# Patient Record
Sex: FEMALE | Race: BLACK OR AFRICAN AMERICAN | Employment: FULL TIME | URBAN - METROPOLITAN AREA
[De-identification: names, ages, dates, MRNs, and addresses within clinical notes are randomized per-mention and may not be internally consistent; named-entity substitution may affect disease eponyms.]

---

## 2017-06-02 ENCOUNTER — HOSPITAL ENCOUNTER (OUTPATIENT)
Dept: CT IMAGING | Age: 67
Discharge: HOME | End: 2017-06-02
Payer: MEDICARE

## 2017-06-02 DIAGNOSIS — R31.9 HEMATURIA: ICD-10-CM

## 2017-06-02 DIAGNOSIS — N13.30 HYDRONEPHROSIS: ICD-10-CM

## 2017-06-02 PROCEDURE — 74176 CT ABD & PELVIS W/O CONTRAST: CPT

## 2017-12-23 ENCOUNTER — ANESTHESIA (OUTPATIENT)
Dept: SURGERY | Age: 67
End: 2017-12-23
Payer: MEDICARE

## 2017-12-23 ENCOUNTER — APPOINTMENT (OUTPATIENT)
Dept: CT IMAGING | Age: 67
End: 2017-12-23
Attending: EMERGENCY MEDICINE
Payer: MEDICARE

## 2017-12-23 ENCOUNTER — ANESTHESIA EVENT (OUTPATIENT)
Dept: SURGERY | Age: 67
End: 2017-12-23
Payer: MEDICARE

## 2017-12-23 ENCOUNTER — HOSPITAL ENCOUNTER (EMERGENCY)
Age: 67
Discharge: HOME | End: 2017-12-23
Attending: EMERGENCY MEDICINE | Admitting: UROLOGY
Payer: MEDICARE

## 2017-12-23 VITALS
OXYGEN SATURATION: 98 % | SYSTOLIC BLOOD PRESSURE: 149 MMHG | DIASTOLIC BLOOD PRESSURE: 67 MMHG | WEIGHT: 140 LBS | BODY MASS INDEX: 26.43 KG/M2 | TEMPERATURE: 97.2 F | HEART RATE: 53 BPM | RESPIRATION RATE: 16 BRPM | HEIGHT: 61 IN

## 2017-12-23 DIAGNOSIS — N20.1 LEFT URETERAL STONE: Primary | ICD-10-CM

## 2017-12-23 DIAGNOSIS — N20.1 RIGHT URETERAL STONE: ICD-10-CM

## 2017-12-23 LAB
ALBUMIN SERPL-MCNC: 3.6 G/DL (ref 3.2–4.6)
ALBUMIN/GLOB SERPL: 1 {RATIO} (ref 1.2–3.5)
ALP SERPL-CCNC: 88 U/L (ref 50–136)
ALT SERPL-CCNC: 32 U/L (ref 12–65)
ANION GAP SERPL CALC-SCNC: 7 MMOL/L (ref 7–16)
AST SERPL-CCNC: 24 U/L (ref 15–37)
BASOPHILS # BLD: 0 K/UL (ref 0–0.2)
BASOPHILS NFR BLD: 0 % (ref 0–2)
BILIRUB SERPL-MCNC: 0.4 MG/DL (ref 0.2–1.1)
BUN SERPL-MCNC: 27 MG/DL (ref 8–23)
CALCIUM SERPL-MCNC: 10.1 MG/DL (ref 8.3–10.4)
CHLORIDE SERPL-SCNC: 107 MMOL/L (ref 98–107)
CO2 SERPL-SCNC: 28 MMOL/L (ref 21–32)
CREAT SERPL-MCNC: 1.12 MG/DL (ref 0.6–1)
DIFFERENTIAL METHOD BLD: ABNORMAL
EOSINOPHIL # BLD: 0 K/UL (ref 0–0.8)
EOSINOPHIL NFR BLD: 0 % (ref 0.5–7.8)
ERYTHROCYTE [DISTWIDTH] IN BLOOD BY AUTOMATED COUNT: 13.9 % (ref 11.9–14.6)
GLOBULIN SER CALC-MCNC: 3.6 G/DL (ref 2.3–3.5)
GLUCOSE SERPL-MCNC: 124 MG/DL (ref 65–100)
HCT VFR BLD AUTO: 39.6 % (ref 35.8–46.3)
HGB BLD-MCNC: 13.4 G/DL (ref 11.7–15.4)
IMM GRANULOCYTES # BLD: 0 K/UL (ref 0–0.5)
IMM GRANULOCYTES NFR BLD AUTO: 0 % (ref 0–5)
LYMPHOCYTES # BLD: 0.9 K/UL (ref 0.5–4.6)
LYMPHOCYTES NFR BLD: 10 % (ref 13–44)
MCH RBC QN AUTO: 31.4 PG (ref 26.1–32.9)
MCHC RBC AUTO-ENTMCNC: 33.8 G/DL (ref 31.4–35)
MCV RBC AUTO: 92.7 FL (ref 79.6–97.8)
MONOCYTES # BLD: 0.7 K/UL (ref 0.1–1.3)
MONOCYTES NFR BLD: 8 % (ref 4–12)
NEUTS SEG # BLD: 7.9 K/UL (ref 1.7–8.2)
NEUTS SEG NFR BLD: 82 % (ref 43–78)
PLATELET # BLD AUTO: 262 K/UL (ref 150–450)
PMV BLD AUTO: 11.2 FL (ref 10.8–14.1)
POTASSIUM SERPL-SCNC: 3.8 MMOL/L (ref 3.5–5.1)
PROT SERPL-MCNC: 7.2 G/DL (ref 6.3–8.2)
RBC # BLD AUTO: 4.27 M/UL (ref 4.05–5.25)
SODIUM SERPL-SCNC: 142 MMOL/L (ref 136–145)
WBC # BLD AUTO: 9.6 K/UL (ref 4.3–11.1)

## 2017-12-23 PROCEDURE — 76060000034 HC ANESTHESIA 1.5 TO 2 HR: Performed by: UROLOGY

## 2017-12-23 PROCEDURE — 74011250636 HC RX REV CODE- 250/636: Performed by: ANESTHESIOLOGY

## 2017-12-23 PROCEDURE — 77030018832 HC SOL IRR H20 ICUM -A: Performed by: UROLOGY

## 2017-12-23 PROCEDURE — 80053 COMPREHEN METABOLIC PANEL: CPT | Performed by: EMERGENCY MEDICINE

## 2017-12-23 PROCEDURE — 74011250636 HC RX REV CODE- 250/636: Performed by: UROLOGY

## 2017-12-23 PROCEDURE — 85025 COMPLETE CBC W/AUTO DIFF WBC: CPT | Performed by: EMERGENCY MEDICINE

## 2017-12-23 PROCEDURE — 77030008703 HC TU ET UNCUF COVD -A: Performed by: ANESTHESIOLOGY

## 2017-12-23 PROCEDURE — C1769 GUIDE WIRE: HCPCS | Performed by: UROLOGY

## 2017-12-23 PROCEDURE — 74011250636 HC RX REV CODE- 250/636

## 2017-12-23 PROCEDURE — 74176 CT ABD & PELVIS W/O CONTRAST: CPT

## 2017-12-23 PROCEDURE — 77030032490 HC SLV COMPR SCD KNE COVD -B: Performed by: UROLOGY

## 2017-12-23 PROCEDURE — 77030019927 HC TBNG IRR CYSTO BAXT -A: Performed by: UROLOGY

## 2017-12-23 PROCEDURE — 74011250636 HC RX REV CODE- 250/636: Performed by: EMERGENCY MEDICINE

## 2017-12-23 PROCEDURE — 99284 EMERGENCY DEPT VISIT MOD MDM: CPT | Performed by: EMERGENCY MEDICINE

## 2017-12-23 PROCEDURE — 76010000162 HC OR TIME 1.5 TO 2 HR INTENSV-TIER 1: Performed by: UROLOGY

## 2017-12-23 PROCEDURE — C2617 STENT, NON-COR, TEM W/O DEL: HCPCS | Performed by: UROLOGY

## 2017-12-23 PROCEDURE — 76210000020 HC REC RM PH II FIRST 0.5 HR: Performed by: UROLOGY

## 2017-12-23 PROCEDURE — C1758 CATHETER, URETERAL: HCPCS | Performed by: UROLOGY

## 2017-12-23 PROCEDURE — 74011000250 HC RX REV CODE- 250

## 2017-12-23 PROCEDURE — 96375 TX/PRO/DX INJ NEW DRUG ADDON: CPT | Performed by: EMERGENCY MEDICINE

## 2017-12-23 PROCEDURE — 96374 THER/PROPH/DIAG INJ IV PUSH: CPT | Performed by: EMERGENCY MEDICINE

## 2017-12-23 PROCEDURE — 76210000006 HC OR PH I REC 0.5 TO 1 HR: Performed by: UROLOGY

## 2017-12-23 PROCEDURE — 81003 URINALYSIS AUTO W/O SCOPE: CPT | Performed by: EMERGENCY MEDICINE

## 2017-12-23 DEVICE — URETERAL STENT
Type: IMPLANTABLE DEVICE | Site: URETER | Status: FUNCTIONAL
Brand: PERCUFLEX™ PLUS

## 2017-12-23 RX ORDER — ESMOLOL HYDROCHLORIDE 10 MG/ML
INJECTION INTRAVENOUS AS NEEDED
Status: DISCONTINUED | OUTPATIENT
Start: 2017-12-23 | End: 2017-12-23 | Stop reason: HOSPADM

## 2017-12-23 RX ORDER — HYDROMORPHONE HYDROCHLORIDE 2 MG/ML
0.5 INJECTION, SOLUTION INTRAMUSCULAR; INTRAVENOUS; SUBCUTANEOUS
Status: DISCONTINUED | OUTPATIENT
Start: 2017-12-23 | End: 2017-12-23 | Stop reason: HOSPADM

## 2017-12-23 RX ORDER — ONDANSETRON 2 MG/ML
INJECTION INTRAMUSCULAR; INTRAVENOUS AS NEEDED
Status: DISCONTINUED | OUTPATIENT
Start: 2017-12-23 | End: 2017-12-23 | Stop reason: HOSPADM

## 2017-12-23 RX ORDER — NEOSTIGMINE METHYLSULFATE 1 MG/ML
INJECTION INTRAVENOUS AS NEEDED
Status: DISCONTINUED | OUTPATIENT
Start: 2017-12-23 | End: 2017-12-23 | Stop reason: HOSPADM

## 2017-12-23 RX ORDER — LIDOCAINE HYDROCHLORIDE 20 MG/ML
INJECTION, SOLUTION EPIDURAL; INFILTRATION; INTRACAUDAL; PERINEURAL AS NEEDED
Status: DISCONTINUED | OUTPATIENT
Start: 2017-12-23 | End: 2017-12-23 | Stop reason: HOSPADM

## 2017-12-23 RX ORDER — OXYCODONE AND ACETAMINOPHEN 7.5; 325 MG/1; MG/1
1 TABLET ORAL
Qty: 30 TAB | Refills: 0 | Status: SHIPPED | OUTPATIENT
Start: 2017-12-23

## 2017-12-23 RX ORDER — CEFAZOLIN SODIUM/WATER 2 G/20 ML
2 SYRINGE (ML) INTRAVENOUS
Status: COMPLETED | OUTPATIENT
Start: 2017-12-23 | End: 2017-12-23

## 2017-12-23 RX ORDER — CIPROFLOXACIN 500 MG/1
500 TABLET ORAL 2 TIMES DAILY
Qty: 10 TAB | Refills: 0 | Status: SHIPPED | OUTPATIENT
Start: 2017-12-23 | End: 2017-12-28

## 2017-12-23 RX ORDER — OXYCODONE HYDROCHLORIDE 5 MG/1
5 TABLET ORAL
Status: DISCONTINUED | OUTPATIENT
Start: 2017-12-23 | End: 2017-12-23 | Stop reason: HOSPADM

## 2017-12-23 RX ORDER — ONDANSETRON 4 MG/1
4 TABLET, ORALLY DISINTEGRATING ORAL
Qty: 8 TAB | Refills: 2 | Status: SHIPPED | OUTPATIENT
Start: 2017-12-23 | End: 2018-06-27 | Stop reason: ALTCHOICE

## 2017-12-23 RX ORDER — ROCURONIUM BROMIDE 10 MG/ML
INJECTION, SOLUTION INTRAVENOUS AS NEEDED
Status: DISCONTINUED | OUTPATIENT
Start: 2017-12-23 | End: 2017-12-23 | Stop reason: HOSPADM

## 2017-12-23 RX ORDER — PHENAZOPYRIDINE HYDROCHLORIDE 200 MG/1
200 TABLET, FILM COATED ORAL
Qty: 12 TAB | Refills: 0 | Status: SHIPPED | OUTPATIENT
Start: 2017-12-23 | End: 2017-12-27

## 2017-12-23 RX ORDER — MIDAZOLAM HYDROCHLORIDE 1 MG/ML
2 INJECTION, SOLUTION INTRAMUSCULAR; INTRAVENOUS
Status: DISCONTINUED | OUTPATIENT
Start: 2017-12-23 | End: 2017-12-23 | Stop reason: HOSPADM

## 2017-12-23 RX ORDER — SODIUM CHLORIDE, SODIUM LACTATE, POTASSIUM CHLORIDE, CALCIUM CHLORIDE 600; 310; 30; 20 MG/100ML; MG/100ML; MG/100ML; MG/100ML
75 INJECTION, SOLUTION INTRAVENOUS CONTINUOUS
Status: DISCONTINUED | OUTPATIENT
Start: 2017-12-23 | End: 2017-12-23 | Stop reason: HOSPADM

## 2017-12-23 RX ORDER — FENTANYL CITRATE 50 UG/ML
INJECTION, SOLUTION INTRAMUSCULAR; INTRAVENOUS AS NEEDED
Status: DISCONTINUED | OUTPATIENT
Start: 2017-12-23 | End: 2017-12-23 | Stop reason: HOSPADM

## 2017-12-23 RX ORDER — PROPOFOL 10 MG/ML
INJECTION, EMULSION INTRAVENOUS AS NEEDED
Status: DISCONTINUED | OUTPATIENT
Start: 2017-12-23 | End: 2017-12-23 | Stop reason: HOSPADM

## 2017-12-23 RX ORDER — GLYCOPYRROLATE 0.2 MG/ML
INJECTION INTRAMUSCULAR; INTRAVENOUS AS NEEDED
Status: DISCONTINUED | OUTPATIENT
Start: 2017-12-23 | End: 2017-12-23 | Stop reason: HOSPADM

## 2017-12-23 RX ORDER — DEXAMETHASONE SODIUM PHOSPHATE 100 MG/10ML
INJECTION INTRAMUSCULAR; INTRAVENOUS AS NEEDED
Status: DISCONTINUED | OUTPATIENT
Start: 2017-12-23 | End: 2017-12-23 | Stop reason: HOSPADM

## 2017-12-23 RX ORDER — ONDANSETRON 2 MG/ML
4 INJECTION INTRAMUSCULAR; INTRAVENOUS
Status: COMPLETED | OUTPATIENT
Start: 2017-12-23 | End: 2017-12-23

## 2017-12-23 RX ORDER — KETOROLAC TROMETHAMINE 30 MG/ML
15 INJECTION, SOLUTION INTRAMUSCULAR; INTRAVENOUS
Status: COMPLETED | OUTPATIENT
Start: 2017-12-23 | End: 2017-12-23

## 2017-12-23 RX ORDER — OXYCODONE AND ACETAMINOPHEN 10; 325 MG/1; MG/1
1 TABLET ORAL AS NEEDED
Status: DISCONTINUED | OUTPATIENT
Start: 2017-12-23 | End: 2017-12-23 | Stop reason: HOSPADM

## 2017-12-23 RX ORDER — SODIUM CHLORIDE 0.9 % (FLUSH) 0.9 %
5-10 SYRINGE (ML) INJECTION AS NEEDED
Status: DISCONTINUED | OUTPATIENT
Start: 2017-12-23 | End: 2017-12-23 | Stop reason: HOSPADM

## 2017-12-23 RX ADMIN — LIDOCAINE HYDROCHLORIDE 100 MG: 20 INJECTION, SOLUTION EPIDURAL; INFILTRATION; INTRACAUDAL; PERINEURAL at 10:19

## 2017-12-23 RX ADMIN — MIDAZOLAM HYDROCHLORIDE 2 MG: 1 INJECTION, SOLUTION INTRAMUSCULAR; INTRAVENOUS at 09:31

## 2017-12-23 RX ADMIN — NEOSTIGMINE METHYLSULFATE 1.5 MG: 1 INJECTION INTRAVENOUS at 11:30

## 2017-12-23 RX ADMIN — ESMOLOL HYDROCHLORIDE 25 MG: 10 INJECTION INTRAVENOUS at 11:34

## 2017-12-23 RX ADMIN — SODIUM CHLORIDE, SODIUM LACTATE, POTASSIUM CHLORIDE, AND CALCIUM CHLORIDE: 600; 310; 30; 20 INJECTION, SOLUTION INTRAVENOUS at 10:09

## 2017-12-23 RX ADMIN — ONDANSETRON 4 MG: 2 INJECTION INTRAMUSCULAR; INTRAVENOUS at 10:21

## 2017-12-23 RX ADMIN — ROCURONIUM BROMIDE 25 MG: 10 INJECTION, SOLUTION INTRAVENOUS at 10:19

## 2017-12-23 RX ADMIN — PROPOFOL 130 MG: 10 INJECTION, EMULSION INTRAVENOUS at 10:19

## 2017-12-23 RX ADMIN — DEXAMETHASONE SODIUM PHOSPHATE 4 MG: 100 INJECTION INTRAMUSCULAR; INTRAVENOUS at 10:15

## 2017-12-23 RX ADMIN — GLYCOPYRROLATE 0.3 MG: 0.2 INJECTION INTRAMUSCULAR; INTRAVENOUS at 11:36

## 2017-12-23 RX ADMIN — FENTANYL CITRATE 50 MCG: 50 INJECTION, SOLUTION INTRAMUSCULAR; INTRAVENOUS at 10:25

## 2017-12-23 RX ADMIN — ONDANSETRON 4 MG: 2 INJECTION INTRAMUSCULAR; INTRAVENOUS at 06:40

## 2017-12-23 RX ADMIN — KETOROLAC TROMETHAMINE 15 MG: 30 INJECTION, SOLUTION INTRAMUSCULAR at 06:40

## 2017-12-23 RX ADMIN — SODIUM CHLORIDE, SODIUM LACTATE, POTASSIUM CHLORIDE, AND CALCIUM CHLORIDE 75 ML/HR: 600; 310; 30; 20 INJECTION, SOLUTION INTRAVENOUS at 08:51

## 2017-12-23 RX ADMIN — Medication 2 G: at 10:24

## 2017-12-23 NOTE — H&P
[facility] 64  SURGICAL HISTORY AND PHYSICAL    Sonam Flores  MR#: [id number]  : 1950  ACCOUNT #: [de-identified]   DATE OF SERVICE: 2017    DIAGNOSIS:  Bilateral ureteral stones. HISTORY OF PRESENT ILLNESS:  The patient is a 56-year-old black female who approximately 1 a.m. this morning began having severe left renal colic. The patient presented to the Emergency Department and was found to have bilateral ureteral stones, each measuring approximately 5 mm. She had moderate right hydronephrosis and moderate left hydronephrosis. It appears that the right ureteral stone may have been present for some time. The patient has been having no right-sided pain. The patient is comfortable now. She was offered a trial of passage versus going ahead and dealing with the stones now since that they are bilateral and she preferred the latter. PAST MEDICAL HISTORY:  Significant for a previous stone approximately two years ago. She has never had any procedures related to stones. PAST SURGICAL HISTORY:  The patient's only previous surgery is a hysterectomy. She denies tobacco usage or ethanol. MEDICATIONS:  Her only medications are 81 mg aspirin and multivitamins. SOCIAL HISTORY:  The patient is single. She again does not smoke and does not drink. FAMILY HISTORY:  Positive for diabetes and hypertension. REVIEW OF SYSTEMS:  Positive for severe left renal colic of a stabbing nature without fever or chills. She denied any chest pain or shortness of breath. PHYSICAL EXAMINATION:  GENERAL:  Reveals an alert, oriented, pleasant black female who at present is not in any distress. VITAL SIGNS:  Temperature is 99.2, pulse 76, blood pressure 130/85, and respirations 20. CHEST:  Clear. HEART:  Reveals no murmurs. Cranial nerves are grossly intact. EXTREMITIES:  Normal.  ABDOMEN:  Soft and nontender, without palpable masses.     LABORATORY DATA:  Revealed a creatinine of 1.12. White blood cell count is 9600, hemoglobin 13.4, and hematocrit 39.6. ASSESSMENT:  Bilateral proximal ureteral stones. Based on the bilateral nature and the fact they are proximal,  I do not think lithotripsy is the best option. Again, the patient was offered a trial of passage, but with the possibility of becoming anuric and over the holiday weekend, the  patient has opted to go ahead with attempt at bilateral ureteroscopy, laser lithotripsy and stents. Risks not exclusive of infection, bleeding, ureteral injury, possible inability to access the stones, and possible need for additional procedures were discussed in detail with the patient who stated that she understood and wished to proceed.       MD Ying Fernandez  D: 12/23/2017 07:28     T: 12/23/2017 07:59  JOB #: [id number]

## 2017-12-23 NOTE — ANESTHESIA PREPROCEDURE EVALUATION
Anesthetic History   No history of anesthetic complications            Review of Systems / Medical History  Patient summary reviewed and pertinent labs reviewed    Pulmonary                   Neuro/Psych   Within defined limits           Cardiovascular                  Exercise tolerance: >4 METS     GI/Hepatic/Renal  Within defined limits              Endo/Other  Within defined limits           Other Findings              Physical Exam    Airway  Mallampati: II  TM Distance: > 6 cm  Neck ROM: normal range of motion   Mouth opening: Normal     Cardiovascular    Rhythm: regular           Dental    Dentition: Full lower dentures and Full upper dentures     Pulmonary                 Abdominal  GI exam deferred       Other Findings            Anesthetic Plan    ASA: 2  Anesthesia type: general          Induction: Intravenous  Anesthetic plan and risks discussed with: Patient

## 2017-12-23 NOTE — ANESTHESIA POSTPROCEDURE EVALUATION
Post-Anesthesia Evaluation and Assessment    Patient: Candie Goldberg MRN: [id number]  SSN: [id number]    YOB: 1950  Age: 75 y.o. Sex: female       Cardiovascular Function/Vital Signs  Visit Vitals    /83    Pulse (!) 50    Temp 36.6 °C (97.9 °F)    Resp 14    Ht 5' 1\" (1.549 m)    Wt 63.5 kg (140 lb)    SpO2 99%    BMI 26.45 kg/m2       Patient is status post general anesthesia for Procedure(s):  CYSTOSCOPY bilateral URETEROSCOPY, Bilateral stent insertion,  laser. Nausea/Vomiting: None    Postoperative hydration reviewed and adequate. Pain:  Pain Scale 1: Visual (12/23/17 1152)  Pain Intensity 1: 0 (12/23/17 1152)   Managed    Neurological Status:   Neuro (WDL): Exceptions to WDL (12/23/17 1152)  Neuro  Neurologic State: Drowsy (12/23/17 1152)  LUE Motor Response: Purposeful (12/23/17 1152)  LLE Motor Response: Purposeful (12/23/17 1152)  RUE Motor Response: Purposeful (12/23/17 1152)  RLE Motor Response: Purposeful (12/23/17 1152)   At baseline    Mental Status and Level of Consciousness: Arousable    Pulmonary Status:   O2 Device: Nasal cannula (12/23/17 1152)   Adequate oxygenation and airway patent    Complications related to anesthesia: None    Post-anesthesia assessment completed.  No concerns    Signed By: Maribell Carballo MD     December 23, 2017

## 2017-12-23 NOTE — BRIEF OP NOTE
BRIEF OPERATIVE NOTE    Date of Procedure: 12/23/2017   Preoperative Diagnosis: Bilateral Ureteral Stone  Postoperative Diagnosis: Bilateral Ureteral Stone    Procedure(s):  CYSTOSCOPY bilateral URETEROSCOPY WITH LASER LITHOTRIPSY/ Bilateral stent insertion,  Laser/URETHRAL DILATION  Surgeon(s) and Role:     * Emily Marshall MD - Primary         Assistant Staff:       Surgical Staff:  Circ-1: Brayan Patel RN  Event Time In   Incision Start 1028   Incision Close 1134     Anesthesia: General   Estimated Blood Loss: <3ml  Specimens: * No specimens in log *   Findings: both stones fragmented--the R was impacted  Complications: 0  Implants:   Implant Name Type Inv.  Item Serial No.  Lot No. LRB No. Used Action   Jerica Camryn FIRM [id number] -- [facility] - [id number]  Jerica Camryn FIRM [id number] -- [facility] [employer] [id number] Right 1 Implanted   Jerica Camryn FIRM [id number] -- [address] [id number]   [address] [place] [id number] -- [employer] Marley Jay [id number] Left 1 Implanted       Op [id number]

## 2017-12-23 NOTE — ED TRIAGE NOTES
Patient states left sided flank pain that woke her up this morning. States that she has had pain like this in the past with kidney stones. Denies fever or trouble urinating.

## 2017-12-23 NOTE — ED PROVIDER NOTES
Patient is a 75 y.o. female presenting with flank pain. The history is provided by the patient. Flank Pain    This is a new problem. The current episode started 1 to 2 hours ago. The problem has been gradually improving. The problem occurs constantly. The pain is associated with no known injury. The pain is present in the left side and lower back. The quality of the pain is described as sharp and stabbing. The pain does not radiate. The pain is at a severity of 6/10. Exacerbated by: nnothing. Pertinent negatives include no fever, no abdominal pain, no perianal numbness, no dysuria, no paresthesias, no paresis, no tingling and no weakness. She has tried nothing for the symptoms. Past Medical History:   Diagnosis Date    Kidney stone        Past Surgical History:   Procedure Laterality Date    HX HYSTERECTOMY           Family History:   Problem Relation Age of Onset    Cancer Other     Diabetes Other     Hypertension Other        Social History     Social History    Marital status: SINGLE     Spouse name: N/A    Number of children: N/A    Years of education: N/A     Occupational History    Not on file. Social History Main Topics    Smoking status: Never Smoker    Smokeless tobacco: Not on file    Alcohol use No    Drug use: Not on file    Sexual activity: Not on file     Other Topics Concern    Not on file     Social History Narrative    No narrative on file         ALLERGIES: Review of patient's allergies indicates no known allergies. Review of Systems   Constitutional: Negative for fever. Gastrointestinal: Negative for abdominal pain. Genitourinary: Positive for flank pain. Negative for dysuria. Neurological: Negative for tingling, weakness and paresthesias.        Vitals:    12/23/17 0546   BP: 130/85   Pulse: 76   Resp: 20   Temp: 99.2 °F (37.3 °C)   SpO2: 99%   Weight: 63.5 kg (140 lb)   Height: 5' 1\" (1.549 m)            Physical Exam   Constitutional: She appears well-developed and well-nourished. No distress. Cardiovascular: Normal rate, regular rhythm and normal heart sounds. Pulmonary/Chest: Effort normal and breath sounds normal.   Abdominal: Soft. She exhibits no distension. There is no tenderness. There is no rebound and no guarding. Musculoskeletal: Normal range of motion. She exhibits no tenderness. Neurological: She has normal strength. No sensory deficit. Nursing note and vitals reviewed. MDM  Number of Diagnoses or Management Options  Diagnosis management comments: History of kidney stones with similar type pain. Pain is not reproducible or worse with movement. Check urine for infection and blood. CT urogram to evaluate for kidney stone. Provide analgesia initially with Toradol.        Amount and/or Complexity of Data Reviewed  Clinical lab tests: ordered and reviewed (Results for orders placed or performed during the hospital encounter of 12/23/17  -CBC WITH AUTOMATED DIFF       Result                                            Value                         Ref Range                       WBC                                               9.6                           4.3 - 11.1 K/uL                 RBC                                               4.27                          4.05 - 5.25 M/uL                HGB                                               13.4                          11.7 - 15.4 g/dL                HCT                                               39.6                          35.8 - 46.3 %                   MCV                                               92.7                          79.6 - 97.8 FL                  MCH                                               31.4                          26.1 - 32.9 PG                  MCHC                                              33.8                          31.4 - 35.0 g/dL                RDW                                               13.9                          11.9 - 14.6 %                   PLATELET                                          262                           150 - 450 K/uL                  MPV                                               11.2                          10.8 - 14.1 FL                  DF                                                AUTOMATED                                                     NEUTROPHILS                                       82 (H)                        43 - 78 %                       LYMPHOCYTES                                       10 (L)                        13 - 44 %                       MONOCYTES                                         8                             4.0 - 12.0 %                    EOSINOPHILS                                       0 (L)                         0.5 - 7.8 %                     BASOPHILS                                         0                             0.0 - 2.0 %                     IMMATURE GRANULOCYTES                             0                             0.0 - 5.0 %                     ABS. NEUTROPHILS                                  7.9                           1.7 - 8.2 K/UL                  ABS. LYMPHOCYTES                                  0.9                           0.5 - 4.6 K/UL                  ABS. MONOCYTES                                    0.7                           0.1 - 1.3 K/UL                  ABS. EOSINOPHILS                                  0.0                           0.0 - 0.8 K/UL                  ABS. BASOPHILS                                    0.0                           0.0 - 0.2 K/UL                  ABS. IMM.  GRANS.                                  0.0                           0.0 - 0.5 K/UL             -METABOLIC PANEL, COMPREHENSIVE       Result                                            Value                         Ref Range                       Sodium                                            142                           136 - 145 mmol/L Potassium                                         3.8                           3.5 - 5.1 mmol/L                Chloride                                          107                           98 - 107 mmol/L                 CO2                                               28                            21 - 32 mmol/L                  Anion gap                                         7                             7 - 16 mmol/L                   Glucose                                           124 (H)                       65 - 100 mg/dL                  BUN                                               27 (H)                        8 - 23 MG/DL                    Creatinine                                        1.12 (H)                      0.6 - 1.0 MG/DL                 GFR est AA                                        >60                           >60 ml/min/1.73m2               GFR est non-AA                                    51 (L)                        >60 ml/min/1.73m2               Calcium                                           10.1                          8.3 - 10.4 MG/DL                Bilirubin, total                                  0.4                           0.2 - 1.1 MG/DL                 ALT (SGPT)                                        32                            12 - 65 U/L                     AST (SGOT)                                        24                            15 - 37 U/L                     Alk. phosphatase                                  88                            50 - 136 U/L                    Protein, total                                    7.2                           6.3 - 8.2 g/dL                  Albumin                                           3.6                           3.2 - 4.6 g/dL                  Globulin                                          3.6 (H)                       2.3 - 3.5 g/dL                  A-G Ratio 1.0 (L)                       1.2 - 3.5                  )  Tests in the radiology section of CPT®: ordered and reviewed (Ct Urogram Wo Cont    Result Date: 12/23/2017  CT ABDOMEN AND PELVIS WITHOUT CONTRAST HISTORY: Left-sided flank pain COMPARISON: 9/4/9618 TECHNIQUE: Helical imaging was performed from the lung bases through the ischial tuberosities without intravenous contrast. Oral contrast was not administered. Coronal and sagittal reformats were performed. Dose reduction techniques used: Automated exposure control, adjustment of the mAs and/or kVp according to patient's size, and iterative reconstruction techniques. FINDINGS: *  LUNG BASES: Within normal limits. *  LIVER: Within normal limits. *  GALLBLADDER AND BILE DUCTS: Normal. *  SPLEEN: Within normal limits. *  URINARY TRACT: Bilateral intrarenal calculi. Moderate right hydronephrosis secondary to 5 x 4 mm stones in the proximal right ureter unchanged. New moderate left hydronephrosis secondary to a 5 x 4 mm proximal ureteral stone. *  ADRENALS: Within normal limits. *  PANCREAS: Within normal limits. *  GASTROINTESTINAL TRACT: Within normal limits. *  RETROPERITONEUM: Within normal limits. *  PERITONEAL CAVITY AND ABDOMINAL WALL: Within normal limits. *  PELVIS: Within normal limits. *  SPINE / BONES: Within normal limits. *  OTHER COMMENTS: None. IMPRESSION: New moderate left hydronephrosis secondary to 5 x 4 mm proximal ureteral stone. Chronic moderate right hydronephrosis identified x4 mm proximal ureteral stone unchanged. Bilateral intrarenal calculi.   Evaluation of the abdominal viscera is limited without intravenous contrast. Date of Dictation: 12/23/2017 6:20 AM     )      ED Course       Procedures

## 2017-12-23 NOTE — DISCHARGE INSTRUCTIONS
Ureteral Stent Placement: What to Expect at [address] [place] [address]  A ureteral (say \"you-REE-ter-ul\") stent is a thin, hollow tube that is placed in the ureter to help urine pass from the kidney into the bladder. Ureters are the tubes that connect the kidneys to the bladder. You may have a small amount of blood in your urine for 1 to 3 days after the procedure. While the stent is in place, you may have to urinate more often, feel a sudden need to urinate, or feel like you cannot completely empty your bladder. You may feel some pain when you urinate or do strenuous activity. You also may notice a small amount of blood in your urine after strenuous activities. These side effects usually do not prevent people from doing their normal daily activities. Your urethra is the tube that carries urine from your bladder to outside your body. This string is attached to the stent. Try not to pull on the string. The doctor will use the string to pull out the stent when you no longer need it. After the procedure, urine may flow better from your kidneys to your bladder. A ureteral stent may be left in place for several days or for as long as several months. Your doctor will take it out when you no longer need it. This care sheet gives you a general idea about how long it will take for you to recover. But each person recovers at a different pace. Follow the steps below to get better as quickly as possible. Cystoscopy: What to Expect at [address] [place] [address]  A cystoscopy is a procedure that lets a doctor look inside of the bladder and the urethra. The urethra is the tube that carries urine from the bladder to outside the body. The doctor uses a thin, lighted tube called a cystoscope to look for kidney or bladder stones, tumors, bleeding, or infection. After the cystoscopy, your urethra may be sore at first, and it may burn when you urinate for the first few days after the procedure.  You may feel the need to urinate more often, and your urine may be pink. These symptoms should get better in 1 or 2 days. You will probably be able to go back to work or most of your usual activities in 1 or 2 days. This care sheet gives you a general idea about how long it will take for you to recover. But each person recovers at a different pace. Follow the steps below to get better as quickly as possible. How can you care for yourself at home? Activity  1. Rest when you feel tired. Getting enough sleep will help you recover. 2. Try to walk each day. Start by walking a little more than you did the day before. Bit by bit, increase the amount you walk. Walking boosts blood flow and helps prevent pneumonia and constipation. 3. Avoid strenuous activities, such as bicycle riding, jogging, weight lifting, or aerobic exercise, until your doctor says it is okay. 4. Ask your doctor when you can drive again. 5. Most people are able to return to work within 1 or 2 days after the procedure. 6. You may shower and take baths as usual.  7. Ask your doctor when it is okay for you to have sex. Diet  · You can eat your normal diet. If your stomach is upset, try bland, low-fat foods like plain rice, broiled chicken, toast, and yogurt. · Drink plenty of fluids (unless your doctor tells you not to). Medicines  · Take pain medicines exactly as directed. ¨ If the doctor gave you a prescription medicine for pain, take it as prescribed. ¨ If you are not taking a prescription pain medicine, ask your doctor if you can take an over-the-counter medicine. · If you think your pain medicine is making you sick to your stomach:  ¨ Take your medicine after meals (unless your doctor has told you not to). ¨ Ask your doctor for a different pain medicine. · If your doctor prescribed antibiotics, take them as directed. Do not stop taking them just because you feel better. You need to take the full course of antibiotics. Follow-up care is a key part of your treatment and safety.  Be sure to make and go to all appointments, and call your doctor if you are having problems. It's also a good idea to know your test results and keep a list of the medicines you take. When should you call for help? Call 911 anytime you think you may need emergency care. For example, call if:  · You passed out (lost consciousness). · You have severe trouble breathing. · You have sudden chest pain and shortness of breath, or you cough up blood. · You have severe belly pain. Call your doctor now or seek immediate medical care if:  · You are sick to your stomach or cannot keep fluids down. · Your urine is still red or you see blood clots after you have urinated several times. · You have trouble passing urine or stool, especially if you have pain or swelling in your lower belly. · You have signs of a blood clot, such as:  ¨ Pain in your calf, back of the knee, thigh, or groin. ¨ Redness and swelling in your leg or groin. · You develop a fever or severe chills. · You have pain in your back just below your rib cage. This is called flank pain. Watch closely for changes in your health, and be sure to contact your doctor if:  · You have pain or burning when you urinate. A burning feeling is normal for a day or two after the test, but call if it does not get better. · You have a frequent urge to urinate but can pass only small amounts of urine. Your urine is pink, red, or cloudy, or smells bad. It is normal for the urine to have a pinkish color for a few days after the test, but call if it does not get better. After general anesthesia or intravenous sedation, for 24 hours or while taking prescription Narcotics:  · Limit your activities  · Do not drive and operate hazardous machinery  · Do not make important personal or business decisions  · Do  not drink alcoholic beverages  · If you have not urinated within 8 hours after discharge, please contact your surgeon on call.     *  Please give a list of your current medications to your Primary Care Provider. *  Please update this list whenever your medications are discontinued, doses are      changed, or new medications (including over-the-counter products) are added. *  Please carry medication information at all times in case of emergency situations. These are general instructions for a healthy lifestyle:  No smoking/ No tobacco products/ Avoid exposure to second hand smoke  Surgeon General's Warning:  Quitting smoking now greatly reduces serious risk to your health. Obesity, smoking, and sedentary lifestyle greatly increases your risk for illness  A healthy diet, regular physical exercise & weight monitoring are important for maintaining a healthy lifestyle    You may be retaining fluid if you have a history of heart failure or if you experience any of the following symptoms:  Weight gain of 3 pounds or more overnight or 5 pounds in a week, increased swelling in our hands or feet or shortness of breath while lying flat in bed. Please call your doctor as soon as you notice any of these symptoms; do not wait until your next office visit. Recognize signs and symptoms of STROKE:    F-face looks uneven  A-arms unable to move or move unevenly  S-speech slurred or non-existent  T-time-call 911 as soon as signs and symptoms begin-DO NOT go       Back to bed or wait to see if you get better-TIME IS BRAIN.

## 2017-12-23 NOTE — PERIOP NOTES
TRANSFER - IN REPORT:    Verbal report received from Dk meneses RN on Mary Josue  being received from ER for ordered procedure      Report consisted of patients Situation, Background, Assessment and   Recommendations(SBAR). Information from the following report(s) SBAR, MAR and Recent Results was reviewed with the receiving nurse. Opportunity for questions and clarification was provided. Assessment completed upon patients arrival to unit and care assumed.

## 2017-12-23 NOTE — IP AVS SNAPSHOT
Yen Jack 
 
 
 [address] [facility] [address] 
[phone] Patient: Rusty Oliva MRN: [id number] ROQ:[id number] About your hospitalization You were admitted on:  N/A You last received care in the:  [facility] PACU You were discharged on:  December 23, 2017 Why you were hospitalized Your primary diagnosis was:  Not on File Things You Need To Do (next 8 weeks) Follow up with Joe Moy MD  
Office will call to schedule follow up appointment to remove stents. Phone:  [phone] Where:  [address], [facility] [address] Follow up with None Where:  None ([phone]) Patient stated that they have no PCP Discharge Orders None A check dorian indicates which time of day the medication should be taken. My Medications TAKE these medications as instructed Instructions Each Dose to Equal  
 Morning Noon Evening Bedtime  
 aspirin delayed-release 81 mg tablet Your last dose was: Your next dose is: Take  by mouth daily. ciprofloxacin HCl 500 mg tablet Commonly known as:  CIPRO Your last dose was: Your next dose is: Take 1 Tab by mouth two (2) times a day for 5 days. 500 mg  
    
   
   
   
  
 multivitamin tablet Commonly known as:  ONE A DAY Your last dose was: Your next dose is: Take 1 Tab by mouth daily. 1 Tab  
    
   
   
   
  
 ondansetron 4 mg disintegrating tablet Commonly known as:  ZOFRAN ODT Your last dose was: Your next dose is: Take 1 Tab by mouth every eight (8) hours as needed for Nausea. 4 mg  
    
   
   
   
  
 oxyCODONE-acetaminophen 7.5-325 mg per tablet Commonly known as:  PERCOCET 7.5 Your last dose was: Your next dose is: Take 1 Tab by mouth every four (4) hours as needed for Pain.  Max Daily Amount: 6 Tabs. 1 Tab  
    
   
   
   
  
 phenazopyridine 200 mg tablet Commonly known as:  PYRIDIUM Your last dose was: Your next dose is: Take 1 Tab by mouth three (3) times daily as needed for Pain for up to 4 days. 200 mg Where to Get Your Medications Information on where to get these meds will be given to you by the nurse or doctor. ! Ask your nurse or doctor about these medications  
  ciprofloxacin HCl 500 mg tablet  
 ondansetron 4 mg disintegrating tablet  
 oxyCODONE-acetaminophen 7.5-325 mg per tablet  
 phenazopyridine 200 mg tablet Discharge Instructions Ureteral Stent Placement: What to Expect at Home Your Recovery A ureteral (say \"you-REE-ter-ul\") stent is a thin, hollow tube that is placed in the ureter to help urine pass from the kidney into the bladder. Ureters are the tubes that connect the kidneys to the bladder. You may have a small amount of blood in your urine for 1 to 3 days after the procedure. While the stent is in place, you may have to urinate more often, feel a sudden need to urinate, or feel like you cannot completely empty your bladder. You may feel some pain when you urinate or do strenuous activity. You also may notice a small amount of blood in your urine after strenuous activities. These side effects usually do not prevent people from doing their normal daily activities. Your urethra is the tube that carries urine from your bladder to outside your body. This string is attached to the stent. Try not to pull on the string. The doctor will use the string to pull out the stent when you no longer need it. After the procedure, urine may flow better from your kidneys to your bladder. A ureteral stent may be left in place for several days or for as long as several months. Your doctor will take it out when you no longer need it. This care sheet gives you a general idea about how long it will take for you to recover. But each person recovers at a different pace. Follow the steps below to get better as quickly as possible. Cystoscopy: What to Expect at [facility] Your Recovery A cystoscopy is a procedure that lets a doctor look inside of the bladder and the urethra. The urethra is the tube that carries urine from the bladder to outside the body. The doctor uses a thin, lighted tube called a cystoscope to look for kidney or bladder stones, tumors, bleeding, or infection. After the cystoscopy, your urethra may be sore at first, and it may burn when you urinate for the first few days after the procedure. You may feel the need to urinate more often, and your urine may be pink. These symptoms should get better in 1 or 2 days. You will probably be able to go back to work or most of your usual activities in 1 or 2 days. This care sheet gives you a general idea about how long it will take for you to recover. But each person recovers at a different pace. Follow the steps below to get better as quickly as possible. How can you care for yourself at home? Activity 1. Rest when you feel tired. Getting enough sleep will help you recover. 2. Try to walk each day. Start by walking a little more than you did the day before. Bit by bit, increase the amount you walk. Walking boosts blood flow and helps prevent pneumonia and constipation. 3. Avoid strenuous activities, such as bicycle riding, jogging, weight lifting, or aerobic exercise, until your doctor says it is okay. 4. Ask your doctor when you can drive again. 5. Most people are able to return to work within 1 or 2 days after the procedure. 6. You may shower and take baths as usual. 
7. Ask your doctor when it is okay for you to have sex. Diet · You can eat your normal diet. If your stomach is upset, try bland, low-fat foods like plain rice, broiled chicken, toast, and yogurt. · Drink plenty of fluids (unless your doctor tells you not to). Medicines · Take pain medicines exactly as directed. ¨ If the doctor gave you a prescription medicine for pain, take it as prescribed. ¨ If you are not taking a prescription pain medicine, ask your doctor if you can take an over-the-counter medicine. · If you think your pain medicine is making you sick to your stomach: 
¨ Take your medicine after meals (unless your doctor has told you not to). ¨ Ask your doctor for a different pain medicine. · If your doctor prescribed antibiotics, take them as directed. Do not stop taking them just because you feel better. You need to take the full course of antibiotics. Follow-up care is a key part of your treatment and safety. Be sure to make and go to all appointments, and call your doctor if you are having problems. It's also a good idea to know your test results and keep a list of the medicines you take. When should you call for help? Call 911 anytime you think you may need emergency care. For example, call if: 
· You passed out (lost consciousness). · You have severe trouble breathing. · You have sudden chest pain and shortness of breath, or you cough up blood. · You have severe belly pain. Call your doctor now or seek immediate medical care if: 
· You are sick to your stomach or cannot keep fluids down. · Your urine is still red or you see blood clots after you have urinated several times. · You have trouble passing urine or stool, especially if you have pain or swelling in your lower belly. · You have signs of a blood clot, such as: 
¨ Pain in your calf, back of the knee, thigh, or groin. ¨ Redness and swelling in your leg or groin. · You develop a fever or severe chills. · You have pain in your back just below your rib cage. This is called flank pain. Watch closely for changes in your health, and be sure to contact your doctor if: · You have pain or burning when you urinate. A burning feeling is normal for a day or two after the test, but call if it does not get better. · You have a frequent urge to urinate but can pass only small amounts of urine. Your urine is pink, red, or cloudy, or smells bad. It is normal for the urine to have a pinkish color for a few days after the test, but call if it does not get better. After general anesthesia or intravenous sedation, for 24 hours or while taking prescription Narcotics: · Limit your activities · Do not drive and operate hazardous machinery · Do not make important personal or business decisions · Do  not drink alcoholic beverages · If you have not urinated within 8 hours after discharge, please contact your surgeon on call. *  Please give a list of your current medications to your Primary Care Provider. *  Please update this list whenever your medications are discontinued, doses are 
    changed, or new medications (including over-the-counter products) are added. *  Please carry medication information at all times in case of emergency situations. These are general instructions for a healthy lifestyle: No smoking/ No tobacco products/ Avoid exposure to second hand smoke Surgeon General's Warning:  Quitting smoking now greatly reduces serious risk to your health. Obesity, smoking, and sedentary lifestyle greatly increases your risk for illness A healthy diet, regular physical exercise & weight monitoring are important for maintaining a healthy lifestyle You may be retaining fluid if you have a history of heart failure or if you experience any of the following symptoms:  Weight gain of 3 pounds or more overnight or 5 pounds in a week, increased swelling in our hands or feet or shortness of breath while lying flat in bed. Please call your doctor as soon as you notice any of these symptoms; do not wait until your next office visit.  
 
Recognize signs and symptoms of STROKE: 
 F-face looks uneven A-arms unable to move or move unevenly S-speech slurred or non-existent T-time-call 911 as soon as signs and symptoms begin-DO NOT go Back to bed or wait to see if you get better-TIME IS BRAIN. Introducing [facility]! [facility] introduces [employer] patient portal. Now you can access parts of your medical record, email your doctor's office, and request medication refills online. 1. In your internet browser, go to [email or web address] 2. Click on the First Time User? Click Here link in the Sign In box. You will see the New Member Sign Up page. 3. Enter your [employer] Access Code exactly as it appears below. You will not need to use this code after youve completed the sign-up process. If you do not sign up before the expiration date, you must request a new code. · [employer] Access Code: [email or web address] Expires: 3/23/2018 12:05 PM 
 
4. Enter the last four digits of your Social Security Number (xxxx) and Date of Birth (mm/dd/yyyy) as indicated and click Submit. You will be taken to the next sign-up page. 5. Create a [employer] ID. This will be your [employer] login ID and cannot be changed, so think of one that is secure and easy to remember. 6. Create a [employer] password. You can change your password at any time. 7. Enter your Password Reset Question and Answer. This can be used at a later time if you forget your password. 8. Enter your e-mail address. You will receive e-mail notification when new information is available in [address] 9. Click Sign Up. You can now view and download portions of your medical record. 10. Click the Download Summary menu link to download a portable copy of your medical information. If you have questions, please visit the Frequently Asked Questions section of the [employer] website. Remember, [employer] is NOT to be used for urgent needs. For medical emergencies, dial 911. Now available from your iPhone and Android! Providers Seen During Your Hospitalization Provider Specialty Primary office phone Lenny Patel MD Emergency Medicine [phone] Your Primary Care Physician (PCP) Primary Care Physician Office Phone Office Fax NONE ** None ** ** None ** You are allergic to the following No active allergies Recent Documentation Height Weight BMI Smoking Status 1.549 m 63.5 kg 26.45 kg/m2 Never Smoker Emergency Contacts Name Discharge Info Relation Home Work Mobile Bre Rodriguez  Daughter [21] [phone] Patient Belongings The following personal items are in your possession at time of discharge: 
  Dental Appliances: Uppers, Other (comment) (with daughter)         Home Medications: None   Jewelry: None  Clothing: Shirt, Pants, Footwear, Undergarments    Other Valuables: None Please provide this summary of care documentation to your next provider. Signatures-by signing, you are acknowledging that this After Visit Summary has been reviewed with you and you have received a copy. Patient Signature:  ____________________________________________________________ Date:  ____________________________________________________________  
  
Buck Nicolas Provider Signature:  ____________________________________________________________ Date:  ____________________________________________________________

## 2017-12-23 NOTE — OP NOTES
[address] REPORT    Saul Gonzalez  MR#: [id number]  : 1950  ACCOUNT #: [de-identified]   DATE OF SERVICE: 2017    SURGEON:  Jewel Alexander MD    PREOPERATIVE DIAGNOSIS:  Bilateral proximal ureteral stones. POSTOPERATIVE DIAGNOSES:  Bilateral proximal ureteral stones with impacted right ureteral stone and urethral stenosis. PROCEDURES PERFORMED:  1. Urethral dilation. 2.  Cystoscopy. 3.  Bilateral ureteroscopy with laser lithotripsy. 4.  Bilateral stent insertion. ANESTHESIA: GENERAL ET    INDICATIONS FOR PROCEDURE:  The patient is a 65-year-old black female who was seen sometime ago in our office. She had a proximal right ureteral stone but was lost to followup. She presented in the emergency room today with an onset of left renal colic beginning around 1 a.m. A CT showed bilateral 5 mm ureteral stones in the proximal ureter with moderate hydronephrosis. The creatinine, however, was normal and the patient was not anuric. However, based on the fact that I felt that the right ureteral stone may have already been present for quite some time and with the onset of renal colic, recommendation was to go ahead with ureteroscopy and laser lithotripsy. Risks, benefits and alternatives were fully discussed with the patient, who stated that she understood and wished to proceed. DESCRIPTION OF PROCEDURE:  The patient received Ancef 2 g IV piggyback. General endotracheal anesthesia was obtained. Cystoscopy could not be performed initially as the urethral meatus was tight. Oriana Mate sounds were utilized to dilate the urethra up to 26-Polish. The 22-Polish cystoscope sheath, 30-degree lens and video camera were used to perform cystoscopy. The bladder was free of tumors, stones or foreign bodies. The left ureter was addressed initially. A  of an inch floppy Luis Antonio-coated guidewire was manipulated up the left ureter without difficulty.   The 6-Haitian ureteroscope then was negotiated into the proximal ureter where a stone was localized. The holmium laser at a setting of 0.5 joules and 5 Hz was utilized to fragment this stone into multiple smaller pieces. The scope was backed down the ureter and the guidewire was backloaded over the cystoscope and a 6-Haitian x 24 cm Percuflex stent with short piece of attached suture was threaded over the guidewire and manipulated into the kidney with a good coil obtained in the kidney and a good coil obtained in the bladder. In a similar fashion, the guidewire was passed up the right ureter. I initially could not get it to the level of the kidney, but with the aid of an open-ended catheter was able to do so. The 6-Haitian ureteroscope then was easily negotiated up to the level of the stone, which was impacted and had tissue growing around it. With the aid of another guidewire, I was ultimately able to dislodge the stone. The holmium laser at the same setting was used to break this stone up into smaller pieces that I do feel will pass. The scope was advanced and I saw no further stones. The scope then was removed and in a similar fashion the guidewire was backloaded over the cystoscope and a 6-Haitian x 24 cm Percuflex stent was threaded into the kidney with a good coil obtained in the kidney and good coil obtained in the bladder. SPECIMENS:  There were no specimens to pathology. COMPLICATIONS:  No apparent complications. ESTIMATED BLOOD LOSS:  Less than 3 mL.       MD Edgar Trujillo / Mark Velasquez  D: 12/23/2017 12:01     T: 12/23/2017 12:32  JOB #: [id number]

## 2024-08-19 ENCOUNTER — TRANSCRIBE ORDERS (OUTPATIENT)
Dept: SCHEDULING | Age: 74
End: 2024-08-19

## 2024-08-19 DIAGNOSIS — Z12.31 SCREENING MAMMOGRAM FOR HIGH-RISK PATIENT: Primary | ICD-10-CM

## (undated) DEVICE — CYSTO: Brand: MEDLINE INDUSTRIES, INC.

## (undated) DEVICE — TRAY PREP DRY W/ PREM GLV 2 APPL 6 SPNG 2 UNDPD 1 OVERWRAP

## (undated) DEVICE — KENDALL SCD EXPRESS SLEEVES, KNEE LENGTH, MEDIUM: Brand: KENDALL SCD

## (undated) DEVICE — CYSTO/BLADDER IRRIGATION SET, REGULATING CLAMP

## (undated) DEVICE — SOLUTION IRRIG 3000ML H2O STRL BAG

## (undated) DEVICE — GUIDEWIRE ENDOSCP L150CM DIA0.038IN TIP L3CM PTFE FLX STR

## (undated) DEVICE — CATH URET 5FRX70CM W/OPN END -- BX/20